# Patient Record
Sex: MALE | Race: WHITE | NOT HISPANIC OR LATINO | URBAN - METROPOLITAN AREA
[De-identification: names, ages, dates, MRNs, and addresses within clinical notes are randomized per-mention and may not be internally consistent; named-entity substitution may affect disease eponyms.]

---

## 2024-07-27 ENCOUNTER — OFFICE VISIT (OUTPATIENT)
Age: 69
End: 2024-07-27
Payer: MEDICARE

## 2024-07-27 VITALS
DIASTOLIC BLOOD PRESSURE: 87 MMHG | HEART RATE: 72 BPM | SYSTOLIC BLOOD PRESSURE: 147 MMHG | TEMPERATURE: 97.6 F | HEIGHT: 71 IN | WEIGHT: 183.6 LBS | BODY MASS INDEX: 25.7 KG/M2 | OXYGEN SATURATION: 95 % | RESPIRATION RATE: 18 BRPM

## 2024-07-27 DIAGNOSIS — S01.301A OPEN WOUND OF AURICLE OF RIGHT EAR, INITIAL ENCOUNTER: Primary | ICD-10-CM

## 2024-07-27 PROCEDURE — G0463 HOSPITAL OUTPT CLINIC VISIT: HCPCS | Performed by: PHYSICIAN ASSISTANT

## 2024-07-27 PROCEDURE — 99203 OFFICE O/P NEW LOW 30 MIN: CPT | Performed by: PHYSICIAN ASSISTANT

## 2024-07-27 RX ORDER — CLOPIDOGREL BISULFATE 75 MG/1
75 TABLET ORAL DAILY
COMMUNITY

## 2024-07-27 RX ORDER — ASPIRIN 81 MG/1
81 TABLET, CHEWABLE ORAL DAILY
COMMUNITY

## 2024-07-27 RX ORDER — LOSARTAN POTASSIUM 25 MG/1
25 TABLET ORAL DAILY
COMMUNITY

## 2024-07-27 NOTE — PROGRESS NOTES
[facility]        NAME: Aditya Russ is a 68 y.o. male  : 1955    MRN: [id number]  DATE: 2024  TIME: 3:18 PM    Assessment and Plan   Open wound of auricle of right ear, initial encounter [S01.301A]  1. Open wound of auricle of right ear, initial encounter  Laceration repair            Patient Instructions     Pt has suffered small open wound superior aspect of the right auricle that was repaired with surgical glue and hemostasis was achieved. He is to keep clean and dry and closely observe. Should be reevaluated if any complications arise.   Follow up with PCP in 3-5 days.  Proceed to  ER if symptoms worsen.    If tests have been performed at [facility], our office will contact you with results if changes need to be made to the care plan discussed with you at the visit.  You can review your full results on [facility] MyChart.    Chief Complaint     Chief Complaint   Patient presents with    Ear Laceration     Happened yesterday/ was trimming bushes. Small laceration noted. Patient reports taking ASA/xarelto daily.          History of Present Illness       Pt presents after suffering small open wound to his right external ear, specifically the auricle, which occurred yesterday. He was outside [place] when it occurred. He is currently on anticoagulants and reports it has not stopped bleeding. Denies FB. He is still UTD on his tetanus status.         Review of Systems   Review of Systems   Constitutional: Negative.    Respiratory: Negative.     Cardiovascular: Negative.    Gastrointestinal: Negative.    Genitourinary: Negative.    Skin:  Positive for wound (Right auricle).         Current Medications       Current Outpatient Medications:     aspirin 81 mg chewable tablet, Chew 81 mg daily, Disp: , Rfl:     losartan (COZAAR) 25 mg tablet, Take 25 mg by mouth daily, Disp: , Rfl:     Rivaroxaban (XARELTO PO), Take by mouth, Disp: , Rfl:     clopidogrel (PLAVIX) 75 mg tablet, Take 75  "mg by mouth daily (Patient not taking: Reported on 7/27/2024), Disp: , Rfl:     Current Allergies     Allergies as of 07/27/2024    (Not on File)            The following portions of the patient's history were reviewed and updated as appropriate: allergies, current medications, past family history, past medical history, past social history, past surgical history and problem list.     Past Medical History:   Diagnosis Date    Hypertension        History reviewed. No pertinent surgical history.    History reviewed. No pertinent family history.      Medications have been verified.        Objective   /87   Pulse 72   Temp 97.6 °F (36.4 °C) (Tympanic)   Resp 18   Ht 5' 11\" (1.803 m)   Wt 83.3 kg (183 lb 9.6 oz)   SpO2 95%   BMI 25.61 kg/m²   No LMP for male patient.       Physical Exam     Physical Exam  Vitals reviewed.   Constitutional:       General: He is not in acute distress.     Appearance: He is well-developed.   HENT:      Ears:      Comments: Superficial, barely visible less than 0.5 cm open wound to the superior aspect of the right auricle with active bleeding. No tendon, nerve, or vascular involvement suspected. No FB palpated or visualized.   Neurological:      Mental Status: He is alert and oriented to person, place, and time.             Universal Protocol:  Consent: Verbal consent obtained. Written consent not obtained.  Risks and benefits: risks, benefits and alternatives were discussed  Consent given by: patient  Patient understanding: patient states understanding of the procedure being performed  Laceration repair    Date/Time: 7/27/2024 1:00 PM    Performed by: Martin Azar PA-C  Authorized by: Martin Azar PA-C  Body area: head/neck  Location details: right ear  Wound length (cm): <0.5 cm.  Foreign bodies: no foreign bodies  Tendon involvement: none  Nerve involvement: none  Vascular damage: no    Sedation:  Patient sedated: no        Procedure Details:  Debridement: none  Degree of " undermining: none  Skin closure: glue  Approximation: close  Approximation difficulty: simple  Patient tolerance: patient tolerated the procedure well with no immediate complications